# Patient Record
Sex: MALE | Race: WHITE | NOT HISPANIC OR LATINO | URBAN - NONMETROPOLITAN AREA
[De-identification: names, ages, dates, MRNs, and addresses within clinical notes are randomized per-mention and may not be internally consistent; named-entity substitution may affect disease eponyms.]

---

## 2021-01-26 ENCOUNTER — COMPLETE SKIN EXAM (OUTPATIENT)
Dept: URBAN - NONMETROPOLITAN AREA CLINIC 2 | Facility: CLINIC | Age: 66
Setting detail: DERMATOLOGY
End: 2021-01-26

## 2021-01-26 DIAGNOSIS — D22.9 MELANOCYTIC NEVI, UNSPECIFIED: ICD-10-CM

## 2021-01-26 DIAGNOSIS — L72.8 OTHER FOLLICULAR CYSTS OF THE SKIN AND SUBCUTANEOUS TISSUE: ICD-10-CM

## 2021-01-26 DIAGNOSIS — Z85.828 PERSONAL HISTORY OF OTHER MALIGNANT NEOPLASM OF SKIN: ICD-10-CM

## 2021-01-26 DIAGNOSIS — L30.9 DERMATITIS, UNSPECIFIED: ICD-10-CM

## 2021-01-26 DIAGNOSIS — L82.1 OTHER SEBORRHEIC KERATOSIS: ICD-10-CM

## 2021-01-26 DIAGNOSIS — L81.4 OTHER MELANIN HYPERPIGMENTATION: ICD-10-CM

## 2021-01-26 PROCEDURE — 17003 DESTRUCT PREMALG LES 2-14: CPT

## 2021-01-26 PROCEDURE — 99214 OFFICE O/P EST MOD 30 MIN: CPT

## 2021-01-26 PROCEDURE — 11102 TANGNTL BX SKIN SINGLE LES: CPT

## 2021-01-26 PROCEDURE — 17000 DESTRUCT PREMALG LESION: CPT

## 2022-01-27 ENCOUNTER — COMPLETE SKIN EXAM (OUTPATIENT)
Dept: URBAN - NONMETROPOLITAN AREA CLINIC 2 | Facility: CLINIC | Age: 67
Setting detail: DERMATOLOGY
End: 2022-01-27

## 2022-02-15 ENCOUNTER — COMPLETE SKIN EXAM (OUTPATIENT)
Dept: URBAN - NONMETROPOLITAN AREA CLINIC 2 | Facility: CLINIC | Age: 67
Setting detail: DERMATOLOGY
End: 2022-02-15

## 2022-02-15 DIAGNOSIS — L73.2 HIDRADENITIS SUPPURATIVA: ICD-10-CM

## 2022-02-15 PROCEDURE — 11102 TANGNTL BX SKIN SINGLE LES: CPT

## 2022-02-15 PROCEDURE — 99214 OFFICE O/P EST MOD 30 MIN: CPT

## 2022-03-11 ENCOUNTER — EXCISION (OUTPATIENT)
Dept: URBAN - NONMETROPOLITAN AREA CLINIC 2 | Facility: CLINIC | Age: 67
Setting detail: DERMATOLOGY
End: 2022-03-11

## 2022-03-11 DIAGNOSIS — L82.0 INFLAMED SEBORRHEIC KERATOSIS: ICD-10-CM

## 2022-03-11 PROCEDURE — 11401 EXC TR-EXT B9+MARG 0.6-1 CM: CPT

## 2023-02-15 ENCOUNTER — APPOINTMENT (OUTPATIENT)
Dept: URBAN - NONMETROPOLITAN AREA SURGERY 8 | Age: 68
Setting detail: DERMATOLOGY
End: 2023-02-15

## 2023-02-15 DIAGNOSIS — L82.1 OTHER SEBORRHEIC KERATOSIS: ICD-10-CM

## 2023-02-15 DIAGNOSIS — L57.0 ACTINIC KERATOSIS: ICD-10-CM

## 2023-02-15 DIAGNOSIS — Z87.2 PERSONAL HISTORY OF DISEASES OF THE SKIN AND SUBCUTANEOUS TISSUE: ICD-10-CM

## 2023-02-15 DIAGNOSIS — D485 NEOPLASM OF UNCERTAIN BEHAVIOR OF SKIN: ICD-10-CM

## 2023-02-15 DIAGNOSIS — Z71.89 OTHER SPECIFIED COUNSELING: ICD-10-CM

## 2023-02-15 DIAGNOSIS — D18.0 HEMANGIOMA: ICD-10-CM

## 2023-02-15 PROBLEM — D18.01 HEMANGIOMA OF SKIN AND SUBCUTANEOUS TISSUE: Status: ACTIVE | Noted: 2023-02-15

## 2023-02-15 PROBLEM — D48.5 NEOPLASM OF UNCERTAIN BEHAVIOR OF SKIN: Status: ACTIVE | Noted: 2023-02-15

## 2023-02-15 PROCEDURE — OTHER DEFER: OTHER

## 2023-02-15 PROCEDURE — 17000 DESTRUCT PREMALG LESION: CPT | Mod: 59

## 2023-02-15 PROCEDURE — 99213 OFFICE O/P EST LOW 20 MIN: CPT | Mod: 25

## 2023-02-15 PROCEDURE — OTHER BIOPSY BY SHAVE METHOD: OTHER

## 2023-02-15 PROCEDURE — 69100 BIOPSY OF EXTERNAL EAR: CPT | Mod: 59

## 2023-02-15 PROCEDURE — OTHER COUNSELING: OTHER

## 2023-02-15 PROCEDURE — 11103 TANGNTL BX SKIN EA SEP/ADDL: CPT | Mod: 59

## 2023-02-15 PROCEDURE — OTHER MIPS QUALITY: OTHER

## 2023-02-15 PROCEDURE — 11102 TANGNTL BX SKIN SINGLE LES: CPT | Mod: 59

## 2023-02-15 PROCEDURE — OTHER LIQUID NITROGEN: OTHER

## 2023-02-15 PROCEDURE — 17003 DESTRUCT PREMALG LES 2-14: CPT

## 2023-02-15 ASSESSMENT — LOCATION DETAILED DESCRIPTION DERM
LOCATION DETAILED: RIGHT BUTTOCK
LOCATION DETAILED: RIGHT CENTRAL MALAR CHEEK
LOCATION DETAILED: LEFT PROXIMAL DORSAL FOREARM
LOCATION DETAILED: LEFT ANTERIOR PROXIMAL THIGH
LOCATION DETAILED: LEFT VENTRAL PROXIMAL FOREARM
LOCATION DETAILED: STERNUM
LOCATION DETAILED: RIGHT SUPERIOR UPPER BACK
LOCATION DETAILED: LEFT ANTERIOR PROXIMAL UPPER ARM
LOCATION DETAILED: LEFT DISTAL PRETIBIAL REGION
LOCATION DETAILED: RIGHT MID-UPPER BACK
LOCATION DETAILED: LEFT DISTAL CALF
LOCATION DETAILED: SUPERIOR LUMBAR SPINE
LOCATION DETAILED: RIGHT DISTAL PRETIBIAL REGION
LOCATION DETAILED: LEFT SUPERIOR HELIX
LOCATION DETAILED: LEFT KNEE

## 2023-02-15 ASSESSMENT — LOCATION ZONE DERM
LOCATION ZONE: LEG
LOCATION ZONE: TRUNK
LOCATION ZONE: FACE
LOCATION ZONE: ARM
LOCATION ZONE: EAR

## 2023-02-15 ASSESSMENT — LOCATION SIMPLE DESCRIPTION DERM
LOCATION SIMPLE: RIGHT BUTTOCK
LOCATION SIMPLE: LEFT FOREARM
LOCATION SIMPLE: LEFT EAR
LOCATION SIMPLE: LOWER BACK
LOCATION SIMPLE: LEFT KNEE
LOCATION SIMPLE: CHEST
LOCATION SIMPLE: LEFT THIGH
LOCATION SIMPLE: LEFT CALF
LOCATION SIMPLE: RIGHT UPPER BACK
LOCATION SIMPLE: LEFT UPPER ARM
LOCATION SIMPLE: LEFT PRETIBIAL REGION
LOCATION SIMPLE: RIGHT CHEEK
LOCATION SIMPLE: RIGHT PRETIBIAL REGION

## 2023-02-15 NOTE — PROCEDURE: LIQUID NITROGEN
Duration Of Freeze Thaw-Cycle (Seconds): 10
Post-Care Instructions: I reviewed with the patient in detail post-care instructions. Patient is to wear sunprotection, and avoid picking at any of the treated lesions. Pt may apply Vaseline to crusted or scabbing areas.
Render Post-Care Instructions In Note?: no
Show Aperture Variable?: Yes
Consent: The patient's consent was obtained including but not limited to risks of crusting, scabbing, blistering, scarring, darker or lighter pigmentary change, recurrence, incomplete removal and infection.
Detail Level: Detailed
Number Of Freeze-Thaw Cycles: 1 freeze-thaw cycle

## 2023-02-15 NOTE — PROCEDURE: MIPS QUALITY
Quality 110: Preventive Care And Screening: Influenza Immunization: Influenza Immunization Administered during Influenza season
Quality 47: Advance Care Plan: Advance Care Planning discussed and documented in the medical record; patient did not wish or was not able to name a surrogate decision maker or provide an advance care plan.
Quality 111:Pneumonia Vaccination Status For Older Adults: Pneumococcal vaccine (PPSV23) was not administered on or after patient’s 60th birthday and before the end of the measurement period, reason not otherwise specified
Quality 226: Preventive Care And Screening: Tobacco Use: Screening And Cessation Intervention: Patient screened for tobacco use and is an ex/non-smoker
Detail Level: Detailed
Quality 130: Documentation Of Current Medications In The Medical Record: Current Medications Documented
Quality 431: Preventive Care And Screening: Unhealthy Alcohol Use - Screening: Patient not identified as an unhealthy alcohol user when screened for unhealthy alcohol use using a systematic screening method

## 2023-02-15 NOTE — PROCEDURE: BIOPSY BY SHAVE METHOD
Body Location Override (Optional - Billing Will Still Be Based On Selected Body Map Location If Applicable): left helixal rim

## 2023-02-15 NOTE — PROCEDURE: DEFER
Detail Level: Detailed
Body Location Override (Optional - Billing Will Still Be Based On Selected Body Map Location If Applicable): left posterior distal leg
Size Of Lesion In Cm (Optional): 0
Procedure To Be Performed At Next Visit: Biopsy by shave method
Introduction Text (Please End With A Colon): The following procedure was deferred:
Body Location Override (Optional - Billing Will Still Be Based On Selected Body Map Location If Applicable): mid lumbar spine
Body Location Override (Optional - Billing Will Still Be Based On Selected Body Map Location If Applicable): left anterior thigh

## 2023-03-03 ENCOUNTER — APPOINTMENT (OUTPATIENT)
Dept: URBAN - NONMETROPOLITAN AREA SURGERY 8 | Age: 68
Setting detail: DERMATOLOGY
End: 2023-03-06

## 2023-03-03 DIAGNOSIS — D485 NEOPLASM OF UNCERTAIN BEHAVIOR OF SKIN: ICD-10-CM

## 2023-03-03 PROBLEM — D48.5 NEOPLASM OF UNCERTAIN BEHAVIOR OF SKIN: Status: ACTIVE | Noted: 2023-03-03

## 2023-03-03 PROBLEM — C44.519 BASAL CELL CARCINOMA OF SKIN OF OTHER PART OF TRUNK: Status: ACTIVE | Noted: 2023-03-03

## 2023-03-03 PROCEDURE — OTHER BIOPSY BY SHAVE METHOD: OTHER

## 2023-03-03 PROCEDURE — 11103 TANGNTL BX SKIN EA SEP/ADDL: CPT

## 2023-03-03 PROCEDURE — OTHER EXCISION: OTHER

## 2023-03-03 PROCEDURE — 13101 CMPLX RPR TRUNK 2.6-7.5 CM: CPT | Mod: 59

## 2023-03-03 PROCEDURE — 11102 TANGNTL BX SKIN SINGLE LES: CPT | Mod: 59

## 2023-03-03 PROCEDURE — 11602 EXC TR-EXT MAL+MARG 1.1-2 CM: CPT

## 2023-03-03 ASSESSMENT — LOCATION SIMPLE DESCRIPTION DERM
LOCATION SIMPLE: LEFT CALF
LOCATION SIMPLE: LEFT THIGH
LOCATION SIMPLE: LOWER BACK

## 2023-03-03 ASSESSMENT — LOCATION DETAILED DESCRIPTION DERM
LOCATION DETAILED: LEFT DISTAL CALF
LOCATION DETAILED: LEFT ANTERIOR PROXIMAL THIGH
LOCATION DETAILED: SUPERIOR LUMBAR SPINE

## 2023-03-03 ASSESSMENT — LOCATION ZONE DERM
LOCATION ZONE: TRUNK
LOCATION ZONE: LEG

## 2023-03-03 NOTE — PROCEDURE: BIOPSY BY SHAVE METHOD
Patient position supine. Patient prepped and draped per unit standard.    Safety straps applied:N/A   Silver Nitrate Text: The wound bed was treated with silver nitrate after the biopsy was performed.

## 2023-03-03 NOTE — PROCEDURE: EXCISION
Hypotension Repair Performed By Another Provider Text (Leave Blank If You Do Not Want): After the tissue was excised the defect was repaired by another provider.

## 2023-03-03 NOTE — PROCEDURE: EXCISION
COUGH/SHORTNESS OF BREATH Complex Repair And Single Advancement Flap Text: The defect edges were debeveled with a #15 scalpel blade.  The primary defect was closed partially with a complex linear closure.  Given the location of the remaining defect, shape of the defect and the proximity to free margins a single advancement flap was deemed most appropriate for complete closure of the defect.  Using a sterile surgical marker, an appropriate advancement flap was drawn incorporating the defect and placing the expected incisions within the relaxed skin tension lines where possible. The area thus outlined was incised deep to adipose tissue with a #15 scalpel blade. The skin margins were undermined to an appropriate distance in all directions utilizing iris scissors and carried over to close the primary defect.

## 2023-03-21 ENCOUNTER — APPOINTMENT (OUTPATIENT)
Dept: URBAN - NONMETROPOLITAN AREA SURGERY 8 | Age: 68
Setting detail: DERMATOLOGY
End: 2023-03-21

## 2023-03-21 VITALS
DIASTOLIC BLOOD PRESSURE: 77 MMHG | HEART RATE: 72 BPM | RESPIRATION RATE: 14 BRPM | TEMPERATURE: 98.2 F | SYSTOLIC BLOOD PRESSURE: 127 MMHG

## 2023-03-21 PROBLEM — C44.719 BASAL CELL CARCINOMA OF SKIN OF LEFT LOWER LIMB, INCLUDING HIP: Status: ACTIVE | Noted: 2023-03-21

## 2023-03-21 PROCEDURE — OTHER MOHS SURGERY: OTHER

## 2023-03-21 PROCEDURE — 17313 MOHS 1 STAGE T/A/L: CPT

## 2023-03-21 PROCEDURE — 17314 MOHS ADDL STAGE T/A/L: CPT

## 2023-03-21 NOTE — PROCEDURE: MOHS SURGERY
Documentation of consultation visit will be available in patient's [facility] report.   H Plasty Text: Given the location of the defect, shape of the defect and the proximity to free margins a H-plasty was deemed most appropriate for repair.  Using a sterile surgical marker, the appropriate advancement arms of the H-plasty were drawn incorporating the defect and placing the expected incisions within the relaxed skin tension lines where possible. The area thus outlined was incised deep to adipose tissue with a #15 scalpel blade. The skin margins were undermined to an appropriate distance in all directions utilizing iris scissors.  The opposing advancement arms were then advanced into place in opposite direction and anchored with interrupted buried subcutaneous sutures.

## 2023-03-21 NOTE — PROCEDURE: MOHS SURGERY
Body Location Override (Optional - Billing Will Still Be Based On Selected Body Map Location If Applicable): Left distal calf

## 2023-03-21 NOTE — HPI: PROCEDURE (MOHS)
Has The Growth Been Previously Biopsied?: has been previously biopsied
Body Location Override (Optional): Left distal calf
Family Member: Brother

## 2023-03-21 NOTE — PROCEDURE: MOHS SURGERY
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

## 2023-03-21 NOTE — PROCEDURE: MOHS SURGERY
Post procedure patient extubated, noted to have right side weakness, non verbal, not following commands.  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  Taken back to IR immediately for possible thrombectomy.  Procedure is medical necessity.  Wife Lesley updated of situation.       Intermediate Repair And Flap Additional Text (Will Appearing After The Standard Complex Repair Text): The intermediate repair was not sufficient to completely close the primary defect. The remaining additional defect was repaired with the flap mentioned below.

## 2023-03-21 NOTE — PROCEDURE: MOHS SURGERY
Depth Of Tumor Invasion (For Histology): dermis Spironolactone Pregnancy And Lactation Text: This medication can cause feminization of the male fetus and should be avoided during pregnancy. The active metabolite is also found in breast milk.

## 2023-05-01 ENCOUNTER — APPOINTMENT (OUTPATIENT)
Dept: URBAN - NONMETROPOLITAN AREA SURGERY 8 | Age: 68
Setting detail: DERMATOLOGY
End: 2023-05-01

## 2023-05-01 ENCOUNTER — APPOINTMENT (OUTPATIENT)
Dept: URBAN - NONMETROPOLITAN AREA SURGERY 8 | Age: 68
Setting detail: DERMATOLOGY
End: 2023-05-09

## 2023-05-01 DIAGNOSIS — L98419 CHRONIC ULCER OF OTHER SPECIFIED SITES: ICD-10-CM

## 2023-05-01 DIAGNOSIS — L98429 CHRONIC ULCER OF OTHER SPECIFIED SITES: ICD-10-CM

## 2023-05-01 DIAGNOSIS — D22 MELANOCYTIC NEVI: ICD-10-CM

## 2023-05-01 PROBLEM — C44.519 BASAL CELL CARCINOMA OF SKIN OF OTHER PART OF TRUNK: Status: ACTIVE | Noted: 2023-05-01

## 2023-05-01 PROBLEM — D22.72 MELANOCYTIC NEVI OF LEFT LOWER LIMB, INCLUDING HIP: Status: ACTIVE | Noted: 2023-05-01

## 2023-05-01 PROBLEM — L97.321 NON-PRESSURE CHRONIC ULCER OF LEFT ANKLE LIMITED TO BREAKDOWN OF SKIN: Status: ACTIVE | Noted: 2023-05-01

## 2023-05-01 PROCEDURE — 13120 CMPLX RPR S/A/L 1.1-2.5 CM: CPT

## 2023-05-01 PROCEDURE — OTHER COUNSELING: OTHER

## 2023-05-01 PROCEDURE — 11602 EXC TR-EXT MAL+MARG 1.1-2 CM: CPT

## 2023-05-01 PROCEDURE — 13101 CMPLX RPR TRUNK 2.6-7.5 CM: CPT

## 2023-05-01 PROCEDURE — 11401 EXC TR-EXT B9+MARG 0.6-1 CM: CPT

## 2023-05-01 PROCEDURE — OTHER MIPS QUALITY: OTHER

## 2023-05-01 PROCEDURE — OTHER SILVER NITRATE: OTHER

## 2023-05-01 PROCEDURE — OTHER EXCISION: OTHER

## 2023-05-01 PROCEDURE — 99213 OFFICE O/P EST LOW 20 MIN: CPT

## 2023-05-01 ASSESSMENT — LOCATION DETAILED DESCRIPTION DERM
LOCATION DETAILED: LEFT ANTERIOR PROXIMAL THIGH
LOCATION DETAILED: LEFT POSTERIOR ANKLE

## 2023-05-01 ASSESSMENT — LOCATION SIMPLE DESCRIPTION DERM
LOCATION SIMPLE: LEFT THIGH
LOCATION SIMPLE: LEFT ANKLE

## 2023-05-01 ASSESSMENT — LOCATION ZONE DERM
LOCATION ZONE: LEG
LOCATION ZONE: LEG

## 2023-05-01 NOTE — PROCEDURE: SILVER NITRATE
Detail Level: Detailed
Add 85932 Code?: No
Procedure Details: A silver nitrate stick was used for chemical cautery.

## 2023-05-01 NOTE — PROCEDURE: MIPS QUALITY
Quality 431: Preventive Care And Screening: Unhealthy Alcohol Use - Screening: Patient not identified as an unhealthy alcohol user when screened for unhealthy alcohol use using a systematic screening method
Quality 226: Preventive Care And Screening: Tobacco Use: Screening And Cessation Intervention: Patient screened for tobacco use and is an ex/non-smoker
Quality 130: Documentation Of Current Medications In The Medical Record: Current Medications Documented
Quality 111:Pneumonia Vaccination Status For Older Adults: Pneumococcal vaccine (PPSV23) administered on or after patient’s 60th birthday and before the end of the measurement period
Quality 47: Advance Care Plan: Advance Care Planning discussed and documented in the medical record; patient did not wish or was not able to name a surrogate decision maker or provide an advance care plan.
Additional Notes: *** Meds documented to the best of my ability with the resources available
Quality 110: Preventive Care And Screening: Influenza Immunization: Influenza Immunization previously received during influenza season
Detail Level: Detailed

## 2023-05-10 ENCOUNTER — APPOINTMENT (OUTPATIENT)
Dept: URBAN - NONMETROPOLITAN AREA SURGERY 8 | Age: 68
Setting detail: DERMATOLOGY
End: 2023-05-17

## 2023-05-10 DIAGNOSIS — Z48.02 ENCOUNTER FOR REMOVAL OF SUTURES: ICD-10-CM

## 2023-05-10 PROCEDURE — OTHER MIPS QUALITY: OTHER

## 2023-05-10 PROCEDURE — OTHER SUTURE REMOVAL (GLOBAL PERIOD): OTHER

## 2023-05-10 ASSESSMENT — LOCATION SIMPLE DESCRIPTION DERM
LOCATION SIMPLE: LOWER BACK
LOCATION SIMPLE: LEFT THIGH

## 2023-05-10 ASSESSMENT — LOCATION ZONE DERM
LOCATION ZONE: TRUNK
LOCATION ZONE: LEG

## 2023-05-10 ASSESSMENT — LOCATION DETAILED DESCRIPTION DERM
LOCATION DETAILED: SUPERIOR LUMBAR SPINE
LOCATION DETAILED: LEFT ANTERIOR PROXIMAL THIGH

## 2023-05-10 NOTE — PROCEDURE: SUTURE REMOVAL (GLOBAL PERIOD)
Body Location Override (Optional - Billing Will Still Be Based On Selected Body Map Location If Applicable): lumbar spine
Detail Level: Detailed
Add 93135 Cpt? (Important Note: In 2017 The Use Of 84356 Is Being Tracked By Cms To Determine Future Global Period Reimbursement For Global Periods): no
Body Location Override (Optional - Billing Will Still Be Based On Selected Body Map Location If Applicable): left anterior thigh

## 2023-05-10 NOTE — PROCEDURE: MIPS QUALITY
Quality 431: Preventive Care And Screening: Unhealthy Alcohol Use - Screening: Patient not identified as an unhealthy alcohol user when screened for unhealthy alcohol use using a systematic screening method
Additional Notes: *** Meds documented to the best of my ability with the resources available
Quality 111:Pneumonia Vaccination Status For Older Adults: Pneumococcal vaccine (PPSV23) administered on or after patient’s 60th birthday and before the end of the measurement period
Quality 110: Preventive Care And Screening: Influenza Immunization: Influenza Immunization previously received during influenza season
Quality 130: Documentation Of Current Medications In The Medical Record: Current Medications Documented
Detail Level: Detailed
Quality 47: Advance Care Plan: Advance Care Planning discussed and documented in the medical record; patient did not wish or was not able to name a surrogate decision maker or provide an advance care plan.
Quality 226: Preventive Care And Screening: Tobacco Use: Screening And Cessation Intervention: Patient screened for tobacco use and is an ex/non-smoker

## 2023-09-25 ENCOUNTER — APPOINTMENT (OUTPATIENT)
Dept: URBAN - NONMETROPOLITAN AREA SURGERY 8 | Age: 68
Setting detail: DERMATOLOGY
End: 2023-09-25

## 2023-09-25 DIAGNOSIS — Z71.89 OTHER SPECIFIED COUNSELING: ICD-10-CM

## 2023-09-25 DIAGNOSIS — Z87.2 PERSONAL HISTORY OF DISEASES OF THE SKIN AND SUBCUTANEOUS TISSUE: ICD-10-CM

## 2023-09-25 DIAGNOSIS — L57.0 ACTINIC KERATOSIS: ICD-10-CM

## 2023-09-25 DIAGNOSIS — L57.8 OTHER SKIN CHANGES DUE TO CHRONIC EXPOSURE TO NONIONIZING RADIATION: ICD-10-CM

## 2023-09-25 DIAGNOSIS — D485 NEOPLASM OF UNCERTAIN BEHAVIOR OF SKIN: ICD-10-CM

## 2023-09-25 DIAGNOSIS — L82.1 OTHER SEBORRHEIC KERATOSIS: ICD-10-CM

## 2023-09-25 DIAGNOSIS — D22 MELANOCYTIC NEVI: ICD-10-CM

## 2023-09-25 DIAGNOSIS — D18.0 HEMANGIOMA: ICD-10-CM

## 2023-09-25 DIAGNOSIS — Z85.828 PERSONAL HISTORY OF OTHER MALIGNANT NEOPLASM OF SKIN: ICD-10-CM

## 2023-09-25 PROBLEM — D22.5 MELANOCYTIC NEVI OF TRUNK: Status: ACTIVE | Noted: 2023-09-25

## 2023-09-25 PROBLEM — D18.01 HEMANGIOMA OF SKIN AND SUBCUTANEOUS TISSUE: Status: ACTIVE | Noted: 2023-09-25

## 2023-09-25 PROBLEM — D48.5 NEOPLASM OF UNCERTAIN BEHAVIOR OF SKIN: Status: ACTIVE | Noted: 2023-09-25

## 2023-09-25 PROCEDURE — OTHER BIOPSY BY SHAVE METHOD: OTHER

## 2023-09-25 PROCEDURE — OTHER COUNSELING: OTHER

## 2023-09-25 PROCEDURE — 99213 OFFICE O/P EST LOW 20 MIN: CPT | Mod: 25

## 2023-09-25 PROCEDURE — OTHER MIPS QUALITY: OTHER

## 2023-09-25 PROCEDURE — 11102 TANGNTL BX SKIN SINGLE LES: CPT

## 2023-09-25 PROCEDURE — 17000 DESTRUCT PREMALG LESION: CPT | Mod: 59

## 2023-09-25 PROCEDURE — 11103 TANGNTL BX SKIN EA SEP/ADDL: CPT

## 2023-09-25 PROCEDURE — OTHER LIQUID NITROGEN: OTHER

## 2023-09-25 ASSESSMENT — LOCATION ZONE DERM
LOCATION ZONE: SCALP
LOCATION ZONE: TRUNK
LOCATION ZONE: NECK
LOCATION ZONE: LIP
LOCATION ZONE: FACE
LOCATION ZONE: LEG

## 2023-09-25 ASSESSMENT — LOCATION DETAILED DESCRIPTION DERM
LOCATION DETAILED: LEFT KNEE
LOCATION DETAILED: LEFT LATERAL SUPERIOR CHEST
LOCATION DETAILED: RIGHT MEDIAL INFERIOR CHEST
LOCATION DETAILED: RIGHT CLAVICULAR SKIN
LOCATION DETAILED: SUPERIOR THORACIC SPINE
LOCATION DETAILED: INFERIOR THORACIC SPINE
LOCATION DETAILED: LEFT ANTERIOR PROXIMAL THIGH
LOCATION DETAILED: RIGHT POSTERIOR NECK
LOCATION DETAILED: LEFT SUPERIOR UPPER BACK
LOCATION DETAILED: STERNUM
LOCATION DETAILED: LEFT UPPER CUTANEOUS LIP
LOCATION DETAILED: RIGHT INFERIOR MEDIAL FOREHEAD
LOCATION DETAILED: RIGHT INFERIOR FRONTAL SCALP
LOCATION DETAILED: RIGHT BUTTOCK

## 2023-09-25 ASSESSMENT — LOCATION SIMPLE DESCRIPTION DERM
LOCATION SIMPLE: UPPER BACK
LOCATION SIMPLE: LEFT UPPER BACK
LOCATION SIMPLE: RIGHT CLAVICULAR SKIN
LOCATION SIMPLE: RIGHT FOREHEAD
LOCATION SIMPLE: LEFT THIGH
LOCATION SIMPLE: RIGHT BUTTOCK
LOCATION SIMPLE: CHEST
LOCATION SIMPLE: LEFT LIP
LOCATION SIMPLE: POSTERIOR NECK
LOCATION SIMPLE: LEFT KNEE
LOCATION SIMPLE: SCALP

## 2023-09-25 NOTE — PROCEDURE: LIQUID NITROGEN
Consent: The patient's consent was obtained including but not limited to risks of crusting, scabbing, blistering, scarring, darker or lighter pigmentary change, recurrence, incomplete removal and infection.
Show Aperture Variable?: Yes
Post-Care Instructions: I reviewed with the patient in detail post-care instructions. Patient is to wear sunprotection, and avoid picking at any of the treated lesions. Pt may apply Vaseline to crusted or scabbing areas.
Duration Of Freeze Thaw-Cycle (Seconds): 10
Render Note In Bullet Format When Appropriate: No
Number Of Freeze-Thaw Cycles: 1 freeze-thaw cycle
Detail Level: Detailed

## 2023-09-25 NOTE — HPI: FULL BODY SKIN EXAMINATION
What Is The Reason For Today's Visit?: Full Body Skin Examination
What Is The Reason For Today's Visit? (Being Monitored For X): concerning skin lesions on a periodic basis
Additional History: Specifically check a raised lesion on the right post-auricular. The lesion has been present x 2 weeks.

## 2023-09-25 NOTE — PROCEDURE: MIPS QUALITY
Quality 47: Advance Care Plan: Advance Care Planning discussed and documented in the medical record; patient did not wish or was not able to name a surrogate decision maker or provide an advance care plan.
Quality 226: Preventive Care And Screening: Tobacco Use: Screening And Cessation Intervention: Patient screened for tobacco use and is an ex/non-smoker
Quality 431: Preventive Care And Screening: Unhealthy Alcohol Use - Screening: Patient not identified as an unhealthy alcohol user when screened for unhealthy alcohol use using a systematic screening method
Detail Level: Detailed
Quality 130: Documentation Of Current Medications In The Medical Record: Current Medications Documented
Quality 111:Pneumonia Vaccination Status For Older Adults: Pneumococcal vaccine (PPSV23) administered on or after patient’s 60th birthday and before the end of the measurement period
Additional Notes: *** Meds documented to the best of my ability with the resources available
Quality 110: Preventive Care And Screening: Influenza Immunization: Influenza Immunization previously received during influenza season

## 2023-09-25 NOTE — PROCEDURE: BIOPSY BY SHAVE METHOD
Body Location Override (Optional - Billing Will Still Be Based On Selected Body Map Location If Applicable): left upper scapula

## 2023-09-25 NOTE — PROCEDURE: COUNSELING
Detail Level: Simple
Sunscreen Recommendations: Use a sun block with zinc or titanium as primary ingredient EVERY day to face and other sun sensitive areas. A physical sun block, containing zinc and titanium is best as it reflects sun off your skin.  Pure zinc and/ or titanium sun blocks are also excellent for patients with allergies or sensitivities to sunscreen.  Sun blocks should not be used to increase the time spent in sunlight.  \\nUse a sunscreen with zinc or titanium oxide EVERY day.  We recommend Elta MD and ISDIN products.  ISDIN Ageless or Actinica are zinc based but also have an antioxidant and DNA repair enzyme in their formulation which protects your skin on several levels to minimize DNA damage to your skin cells.\\nWear protective clothing, including a hat with a wide brim and long-sleeved shirt and pants during prolonged periods of sun exposure.   A tight weave fabric is best.  Special protective clothing is available through several companies: Coolibar  - www.coolibar.com or Solumbra - www.solumbra.com. Sunsleeve is a great accessory to combat the sun while wearing short sleeve shirts.
Nicotinamide Supplementation Recommendations: SunISDIN or Heliocare (Polypodium leucotomos) capsules are recommended to help the skin protect itself from the sun.  \\nSunISDIN: Take 1 capsule each morning with water or juice. \\nHeliocare Advanced (with nicotinamide):  Take 2 capsules daily.  Nicotinamide lowers risk of SCC by 24%.
Detail Level: Generalized
Detail Level: Zone
Detail Level: Detailed

## 2023-10-09 ENCOUNTER — APPOINTMENT (OUTPATIENT)
Dept: URBAN - NONMETROPOLITAN AREA SURGERY 8 | Age: 68
Setting detail: DERMATOLOGY
End: 2023-10-16

## 2023-10-09 DIAGNOSIS — L08.9 LOCAL INFECTION OF THE SKIN AND SUBCUTANEOUS TISSUE, UNSPECIFIED: ICD-10-CM

## 2023-10-09 PROBLEM — C44.41 BASAL CELL CARCINOMA OF SKIN OF SCALP AND NECK: Status: ACTIVE | Noted: 2023-10-09

## 2023-10-09 PROBLEM — C44.519 BASAL CELL CARCINOMA OF SKIN OF OTHER PART OF TRUNK: Status: ACTIVE | Noted: 2023-10-09

## 2023-10-09 PROCEDURE — 17270 DSTR MAL LES S/N/H/F/G .5 /<: CPT

## 2023-10-09 PROCEDURE — OTHER ADDITIONAL NOTES: OTHER

## 2023-10-09 PROCEDURE — OTHER PRESCRIPTION: OTHER

## 2023-10-09 PROCEDURE — 99212 OFFICE O/P EST SF 10 MIN: CPT | Mod: 25

## 2023-10-09 PROCEDURE — OTHER MIPS QUALITY: OTHER

## 2023-10-09 PROCEDURE — OTHER CURETTAGE AND DESTRUCTION: OTHER

## 2023-10-09 PROCEDURE — OTHER CURETTAGE: OTHER

## 2023-10-09 PROCEDURE — OTHER COUNSELING: OTHER

## 2023-10-09 PROCEDURE — 17260 DSTRJ MAL LES T/A/L 0.5 CM/<: CPT

## 2023-10-09 RX ORDER — DOXYCYCLINE HYCLATE 100 MG/1
CAPSULE, GELATIN COATED ORAL
Qty: 14 | Refills: 0 | Status: ERX | COMMUNITY
Start: 2023-10-09

## 2023-10-09 RX ORDER — MUPIROCIN 20 MG/G
OINTMENT TOPICAL
Qty: 15 | Refills: 1 | Status: ERX | COMMUNITY
Start: 2023-10-09

## 2023-10-09 ASSESSMENT — LOCATION SIMPLE DESCRIPTION DERM: LOCATION SIMPLE: SCALP

## 2023-10-09 ASSESSMENT — LOCATION ZONE DERM: LOCATION ZONE: SCALP

## 2023-10-09 ASSESSMENT — LOCATION DETAILED DESCRIPTION DERM: LOCATION DETAILED: RIGHT INFERIOR FRONTAL SCALP

## 2023-10-09 NOTE — PROCEDURE: CURETTAGE
Body Location Override (Optional - Billing Will Still Be Based On Selected Body Map Location If Applicable): left upper scapula
Detail Level: Detailed
Size Of Lesion In Cm: 0.7
Size Of Lesion After Curettage: 0.1
Anesthesia Type: 1% lidocaine with epinephrine
Anesthesia Volume In Cc: 3
Cautery Type: electrodesiccation
Additional Information: (Optional): The wound was cleaned, and a pressure dressing was applied.  The patient received detailed post-op instructions.
Consent was obtained from the patient. The risks, benefits and alternatives to therapy were discussed in detail. Specifically, the risks of infection, scarring, bleeding, prolonged wound healing, nerve injury, incomplete removal, allergy to anesthesia and recurrence were addressed. Alternatives to ED&C, such as: surgical removal and XRT were also discussed.  Prior to the procedure, the treatment site was clearly identified and confirmed by the patient. All components of Universal Protocol/PAUSE Rule completed.
Render Post-Care Instructions In Note?: no
Post-Care Instructions: I reviewed with the patient in detail post-care instructions. Patient is to keep the area dry for 48 hours, and not to engage in any swimming until the area is healed. Should the patient develop any fevers, chills, bleeding, severe pain patient will contact the office immediately.
Bill As A Line Item Or As Units: Line Item

## 2023-10-09 NOTE — PROCEDURE: ADDITIONAL NOTES
Detail Level: Simple
Render Risk Assessment In Note?: no
Additional Notes: We are planning to do a punch biopsy on his RIGHT PARIETAL SCALP in November

## 2023-10-09 NOTE — PROCEDURE: MIPS QUALITY
Detail Level: Detailed
Quality 111:Pneumonia Vaccination Status For Older Adults: Pneumococcal vaccine (PPSV23) administered on or after patient’s 60th birthday and before the end of the measurement period
Additional Notes: *** Meds documented to the best of my ability with the resources available
Quality 130: Documentation Of Current Medications In The Medical Record: Current Medications Documented
Quality 431: Preventive Care And Screening: Unhealthy Alcohol Use - Screening: Patient not identified as an unhealthy alcohol user when screened for unhealthy alcohol use using a systematic screening method
Quality 110: Preventive Care And Screening: Influenza Immunization: Influenza Immunization previously received during influenza season
Quality 226: Preventive Care And Screening: Tobacco Use: Screening And Cessation Intervention: Patient screened for tobacco use and is an ex/non-smoker

## 2023-11-09 ENCOUNTER — APPOINTMENT (OUTPATIENT)
Dept: URBAN - NONMETROPOLITAN AREA SURGERY 8 | Age: 68
Setting detail: DERMATOLOGY
End: 2023-11-10

## 2023-11-09 DIAGNOSIS — D485 NEOPLASM OF UNCERTAIN BEHAVIOR OF SKIN: ICD-10-CM

## 2023-11-09 PROBLEM — D48.5 NEOPLASM OF UNCERTAIN BEHAVIOR OF SKIN: Status: ACTIVE | Noted: 2023-11-09

## 2023-11-09 PROCEDURE — 99212 OFFICE O/P EST SF 10 MIN: CPT

## 2023-11-09 PROCEDURE — OTHER ADDITIONAL NOTES: OTHER

## 2023-11-09 PROCEDURE — OTHER ACQUIRED DEFORMITY COUNSELING: OTHER

## 2023-11-09 PROCEDURE — OTHER OBSERVATION: OTHER

## 2023-11-09 ASSESSMENT — LOCATION DETAILED DESCRIPTION DERM: LOCATION DETAILED: RIGHT INFERIOR FRONTAL SCALP

## 2023-11-09 ASSESSMENT — LOCATION SIMPLE DESCRIPTION DERM: LOCATION SIMPLE: SCALP

## 2023-11-09 ASSESSMENT — LOCATION ZONE DERM: LOCATION ZONE: SCALP

## 2023-11-09 NOTE — PROCEDURE: ADDITIONAL NOTES
Render Risk Assessment In Note?: no
Additional Notes: Patient will be back in 3 months for his FSE and we will recheck. His spot on the right scalp\\nPatient is having a total knee replacement on December,2023.
Detail Level: Simple
Additional Notes: We discussed a rebiopsy today based on pathology. He feels it is better and would like to monitor for 1-2 months. today it looks benign - such as ruptured folliculitis (mentioned in path report). we will monitor for next 1-2 months. If nodularity develops or any changes occur, he should let me know

## 2023-11-09 NOTE — PROCEDURE: OBSERVATION
Body Location Override (Optional - Billing Will Still Be Based On Selected Body Map Location If Applicable): RIGHT PARIETAL SCALP
Detail Level: Simple
Size Of Lesion In Cm (Optional): 0

## 2024-03-26 NOTE — HPI: PROCEDURE (SKIN SURGERY)
Has The Growth Been Previously Biopsied?: has been previously biopsied
Body Location Override (Optional): Superior lumbar spine and left anterior proximal thigh
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

## 2024-05-21 ENCOUNTER — APPOINTMENT (OUTPATIENT)
Dept: URBAN - NONMETROPOLITAN AREA SURGERY 8 | Age: 69
Setting detail: DERMATOLOGY
End: 2024-05-28

## 2024-05-21 DIAGNOSIS — Z87.2 PERSONAL HISTORY OF DISEASES OF THE SKIN AND SUBCUTANEOUS TISSUE: ICD-10-CM

## 2024-05-21 DIAGNOSIS — D22 MELANOCYTIC NEVI: ICD-10-CM

## 2024-05-21 DIAGNOSIS — L57.0 ACTINIC KERATOSIS: ICD-10-CM

## 2024-05-21 DIAGNOSIS — Z85.828 PERSONAL HISTORY OF OTHER MALIGNANT NEOPLASM OF SKIN: ICD-10-CM

## 2024-05-21 DIAGNOSIS — L57.8 OTHER SKIN CHANGES DUE TO CHRONIC EXPOSURE TO NONIONIZING RADIATION: ICD-10-CM

## 2024-05-21 DIAGNOSIS — L82.1 OTHER SEBORRHEIC KERATOSIS: ICD-10-CM

## 2024-05-21 DIAGNOSIS — D485 NEOPLASM OF UNCERTAIN BEHAVIOR OF SKIN: ICD-10-CM

## 2024-05-21 DIAGNOSIS — D18.0 HEMANGIOMA: ICD-10-CM

## 2024-05-21 DIAGNOSIS — Z71.89 OTHER SPECIFIED COUNSELING: ICD-10-CM

## 2024-05-21 PROBLEM — D48.5 NEOPLASM OF UNCERTAIN BEHAVIOR OF SKIN: Status: ACTIVE | Noted: 2024-05-21

## 2024-05-21 PROBLEM — D22.9 MELANOCYTIC NEVI, UNSPECIFIED: Status: ACTIVE | Noted: 2024-05-21

## 2024-05-21 PROBLEM — D18.01 HEMANGIOMA OF SKIN AND SUBCUTANEOUS TISSUE: Status: ACTIVE | Noted: 2024-05-21

## 2024-05-21 PROCEDURE — OTHER COUNSELING: OTHER

## 2024-05-21 PROCEDURE — 17003 DESTRUCT PREMALG LES 2-14: CPT

## 2024-05-21 PROCEDURE — 17000 DESTRUCT PREMALG LESION: CPT | Mod: 59

## 2024-05-21 PROCEDURE — 99213 OFFICE O/P EST LOW 20 MIN: CPT | Mod: 25

## 2024-05-21 PROCEDURE — 11103 TANGNTL BX SKIN EA SEP/ADDL: CPT

## 2024-05-21 PROCEDURE — OTHER BIOPSY BY SHAVE METHOD: OTHER

## 2024-05-21 PROCEDURE — OTHER LIQUID NITROGEN: OTHER

## 2024-05-21 PROCEDURE — 11102 TANGNTL BX SKIN SINGLE LES: CPT

## 2024-05-21 PROCEDURE — OTHER MIPS QUALITY: OTHER

## 2024-05-21 ASSESSMENT — LOCATION SIMPLE DESCRIPTION DERM
LOCATION SIMPLE: UPPER BACK
LOCATION SIMPLE: LEFT FOREARM
LOCATION SIMPLE: LEFT PRETIBIAL REGION
LOCATION SIMPLE: RIGHT HAND
LOCATION SIMPLE: NECK
LOCATION SIMPLE: LOWER BACK
LOCATION SIMPLE: LEFT THIGH
LOCATION SIMPLE: CHEST
LOCATION SIMPLE: RIGHT UPPER BACK
LOCATION SIMPLE: LEFT UPPER BACK
LOCATION SIMPLE: LEFT CALF
LOCATION SIMPLE: RIGHT FOREARM

## 2024-05-21 ASSESSMENT — LOCATION DETAILED DESCRIPTION DERM
LOCATION DETAILED: LEFT DISTAL CALF
LOCATION DETAILED: STERNUM
LOCATION DETAILED: LEFT PROXIMAL PRETIBIAL REGION
LOCATION DETAILED: LEFT ANTERIOR PROXIMAL THIGH
LOCATION DETAILED: RIGHT SUPERIOR LATERAL NECK
LOCATION DETAILED: RIGHT CENTRAL POSTERIOR NECK
LOCATION DETAILED: LEFT PROXIMAL DORSAL FOREARM
LOCATION DETAILED: RIGHT SUPERIOR UPPER BACK
LOCATION DETAILED: INFERIOR LUMBAR SPINE
LOCATION DETAILED: SUPERIOR THORACIC SPINE
LOCATION DETAILED: RIGHT VENTRAL PROXIMAL FOREARM
LOCATION DETAILED: LEFT SUPERIOR UPPER BACK
LOCATION DETAILED: LEFT PROXIMAL RADIAL DORSAL FOREARM
LOCATION DETAILED: RIGHT PROXIMAL DORSAL FOREARM
LOCATION DETAILED: RIGHT RADIAL DORSAL HAND

## 2024-05-21 ASSESSMENT — LOCATION ZONE DERM
LOCATION ZONE: HAND
LOCATION ZONE: LEG
LOCATION ZONE: TRUNK
LOCATION ZONE: NECK
LOCATION ZONE: ARM

## 2024-05-21 NOTE — PROCEDURE: BIOPSY BY SHAVE METHOD
Body Location Override (Optional - Billing Will Still Be Based On Selected Body Map Location If Applicable): mid upper spine
Detail Level: Detailed
Depth Of Biopsy: dermis
Was A Bandage Applied: Yes
Size Of Lesion In Cm: 0
Biopsy Type: H and E
Biopsy Method: Dermablade
Anesthesia Type: 1% lidocaine with epinephrine
Anesthesia Volume In Cc: 0.5
Hemostasis: Drysol
Wound Care: Petrolatum
Dressing: bandage
Destruction After The Procedure: No
Type Of Destruction Used: Curettage
Curettage Text: The wound bed was treated with curettage after the biopsy was performed.
Cryotherapy Text: The wound bed was treated with cryotherapy after the biopsy was performed.
Electrodesiccation Text: The wound bed was treated with electrodesiccation after the biopsy was performed.
Electrodesiccation And Curettage Text: The wound bed was treated with electrodesiccation and curettage after the biopsy was performed.
Silver Nitrate Text: The wound bed was treated with silver nitrate after the biopsy was performed.
Lab: [id number]
Consent: Written consent was obtained and risks were reviewed including but not limited to scarring, infection, bleeding, scabbing, incomplete removal, nerve damage and allergy to anesthesia.
Post-Care Instructions: I reviewed with the patient in detail post-care instructions. Patient is to keep the biopsy site dry overnight, and then apply bacitracin twice daily until healed. Patient may apply hydrogen peroxide soaks to remove any crusting.
Notification Instructions: Patient will be notified of biopsy results. However, patient instructed to call the office if not contacted within 2 weeks.
Billing Type: Third-Party Bill
Information: Selecting Yes will display possible errors in your note based on the variables you have selected. This validation is only offered as a suggestion for you. PLEASE NOTE THAT THE VALIDATION TEXT WILL BE REMOVED WHEN YOU FINALIZE YOUR NOTE. IF YOU WANT TO FAX A PRELIMINARY NOTE YOU WILL NEED TO TOGGLE THIS TO 'NO' IF YOU DO NOT WANT IT IN YOUR FAXED NOTE.
Body Location Override (Optional - Billing Will Still Be Based On Selected Body Map Location If Applicable): left medial scapula
Body Location Override (Optional - Billing Will Still Be Based On Selected Body Map Location If Applicable): left anterior proximal leg
Body Location Override (Optional - Billing Will Still Be Based On Selected Body Map Location If Applicable): left mid anterior leg

## 2024-05-21 NOTE — PROCEDURE: COUNSELING
Detail Level: Generalized
Detail Level: Zone
Sunscreen Recommendations: Use a sun block with zinc or titanium as primary ingredient EVERY day to face and other sun sensitive areas. A physical sun block, containing zinc and titanium is best as it reflects sun off your skin.  Pure zinc and/ or titanium sun blocks are also excellent for patients with allergies or sensitivities to sunscreen.  Sun blocks should not be used to increase the time spent in sunlight.  \\nUse a sunscreen with zinc or titanium oxide EVERY day.  We recommend Elta MD and ISDIN products.  ISDIN Ageless or Actinica are zinc based but also have an antioxidant and DNA repair enzyme in their formulation which protects your skin on several levels to minimize DNA damage to your skin cells.\\nWear protective clothing, including a hat with a wide brim and long-sleeved shirt and pants during prolonged periods of sun exposure.   A tight weave fabric is best.  Special protective clothing is available through several companies: Coolibar  - www.coolibar.com or Solumbra - www.solumbra.com. Sunsleeve is a great accessory to combat the sun while wearing short sleeve shirts.
Detail Level: Detailed
Nicotinamide Supplementation Recommendations: SunISDIN or Heliocare (Polypodium leucotomos) capsules are recommended to help the skin protect itself from the sun.  \\nSunISDIN: Take 1 capsule each morning with water or juice. \\nHeliocare Advanced (with nicotinamide):  Take 2 capsules daily.  Nicotinamide lowers risk of SCC by 24%.
Body Location Override (Optional - Billing Will Still Be Based On Selected Body Map Location If Applicable): mid upper spine
Depth Of Biopsy: dermis
Was A Bandage Applied: Yes
Size Of Lesion In Cm: 0
Biopsy Type: H and E
Biopsy Method: Dermablade
Anesthesia Type: 1% lidocaine with epinephrine
Anesthesia Volume In Cc: 0.5
Hemostasis: Drysol
Wound Care: Petrolatum
Dressing: bandage
Destruction After The Procedure: No
Type Of Destruction Used: Curettage
Curettage Text: The wound bed was treated with curettage after the biopsy was performed.
Cryotherapy Text: The wound bed was treated with cryotherapy after the biopsy was performed.
Electrodesiccation Text: The wound bed was treated with electrodesiccation after the biopsy was performed.
Electrodesiccation And Curettage Text: The wound bed was treated with electrodesiccation and curettage after the biopsy was performed.
Silver Nitrate Text: The wound bed was treated with silver nitrate after the biopsy was performed.
Lab: [id number]
Consent: Written consent was obtained and risks were reviewed including but not limited to scarring, infection, bleeding, scabbing, incomplete removal, nerve damage and allergy to anesthesia.
Post-Care Instructions: I reviewed with the patient in detail post-care instructions. Patient is to keep the biopsy site dry overnight, and then apply bacitracin twice daily until healed. Patient may apply hydrogen peroxide soaks to remove any crusting.
Notification Instructions: Patient will be notified of biopsy results. However, patient instructed to call the office if not contacted within 2 weeks.
Billing Type: Third-Party Bill
Information: Selecting Yes will display possible errors in your note based on the variables you have selected. This validation is only offered as a suggestion for you. PLEASE NOTE THAT THE VALIDATION TEXT WILL BE REMOVED WHEN YOU FINALIZE YOUR NOTE. IF YOU WANT TO FAX A PRELIMINARY NOTE YOU WILL NEED TO TOGGLE THIS TO 'NO' IF YOU DO NOT WANT IT IN YOUR FAXED NOTE.

## 2024-06-18 ENCOUNTER — APPOINTMENT (OUTPATIENT)
Dept: URBAN - NONMETROPOLITAN AREA SURGERY 8 | Age: 69
Setting detail: DERMATOLOGY
End: 2024-06-18

## 2024-06-18 PROBLEM — C44.719 BASAL CELL CARCINOMA OF SKIN OF LEFT LOWER LIMB, INCLUDING HIP: Status: ACTIVE | Noted: 2024-06-18

## 2024-06-18 PROBLEM — C44.519 BASAL CELL CARCINOMA OF SKIN OF OTHER PART OF TRUNK: Status: ACTIVE | Noted: 2024-06-18

## 2024-06-18 PROBLEM — D04.72 CARCINOMA IN SITU OF SKIN OF LEFT LOWER LIMB, INCLUDING HIP: Status: ACTIVE | Noted: 2024-06-18

## 2024-06-18 PROCEDURE — 17262 DSTRJ MAL LES T/A/L 1.1-2.0: CPT | Mod: 76

## 2024-06-18 PROCEDURE — OTHER MIPS QUALITY: OTHER

## 2024-06-18 PROCEDURE — OTHER CURETTAGE: OTHER

## 2024-06-18 PROCEDURE — OTHER CURETTAGE AND DESTRUCTION: OTHER

## 2024-06-18 PROCEDURE — 17262 DSTRJ MAL LES T/A/L 1.1-2.0: CPT

## 2024-06-18 PROCEDURE — OTHER COUNSELING: OTHER

## 2024-06-18 PROCEDURE — 17263 DSTRJ MAL LES T/A/L 2.1-3.0: CPT

## 2024-06-18 NOTE — PROCEDURE: CURETTAGE AND DESTRUCTION
Body Location Override (Optional - Billing Will Still Be Based On Selected Body Map Location If Applicable): left mid anterior leg
Detail Level: Detailed
Accession # (Optional): [id number]
Number Of Curettages: 4
Size Of Lesion In Cm: 0.8
Size Of Lesion After Curettage: 1.7
Add Intralesional Injection: No
Concentration (Mg/Ml Or Millions Of Plaque Forming Units/Cc): 0.01
Total Volume (Ccs): 1
Anesthesia Type: 1% lidocaine with epinephrine
Anesthesia Volume In Cc: 3
What Was Performed First?: Curettage
Final Size Statement: The size of the lesion after treatment was
Additional Information: (Optional): The wound was cleaned, and a pressure dressing was applied.  The patient received detailed post-op instructions.
Consent was obtained from the patient. The risks, benefits and alternatives to therapy were discussed in detail. Specifically, the risks of infection, scarring, bleeding, prolonged wound healing, nerve injury, incomplete removal, allergy to anesthesia and recurrence were addressed. Alternatives to ED&C, such as: surgical removal and XRT were also discussed.  Prior to the procedure, the treatment site was clearly identified and confirmed by the patient. All components of Universal Protocol/PAUSE Rule completed.
Post-Care Instructions: I reviewed with the patient in detail post-care instructions. Patient is to keep the area dry for 48 hours, and not to engage in any swimming until the area is healed. Should the patient develop any fevers, chills, bleeding, severe pain patient will contact the office immediately.
Bill As A Line Item Or As Units: Line Item

## 2024-06-18 NOTE — PROCEDURE: CURETTAGE
Body Location Override (Optional - Billing Will Still Be Based On Selected Body Map Location If Applicable): left anterior proximal leg
Detail Level: Detailed
Size Of Lesion In Cm: 0.8
Size Of Lesion After Curettage: 1.7
Anesthesia Type: 1% lidocaine with epinephrine
Anesthesia Volume In Cc: 3
Cautery Type: electrodesiccation
Additional Information: (Optional): The wound was cleaned, and a pressure dressing was applied.  The patient received detailed post-op instructions.
Consent was obtained from the patient. The risks, benefits and alternatives to therapy were discussed in detail. Specifically, the risks of infection, scarring, bleeding, prolonged wound healing, nerve injury, incomplete removal, allergy to anesthesia and recurrence were addressed. Alternatives to ED&C, such as: surgical removal and XRT were also discussed.  Prior to the procedure, the treatment site was clearly identified and confirmed by the patient. All components of Universal Protocol/PAUSE Rule completed.
Render Post-Care Instructions In Note?: no
Post-Care Instructions: I reviewed with the patient in detail post-care instructions. Patient is to keep the area dry for 48 hours, and not to engage in any swimming until the area is healed. Should the patient develop any fevers, chills, bleeding, severe pain patient will contact the office immediately.
Bill As A Line Item Or As Units: Line Item
Body Location Override (Optional - Billing Will Still Be Based On Selected Body Map Location If Applicable): mid upper spine
Size Of Lesion In Cm: 1.3
Size Of Lesion After Curettage: 2.3

## 2024-06-18 NOTE — HPI: OTHER
Condition:: Surgical EDC
Please Describe Your Condition:: is an established patient who is being seen for a chief complaint of Surgical EDC for 3 biopsy proven skin cancers, specifically:\\n\\n1)Mid upper spine: Basal cell carcinoma, nodular type\\n2)Left proximal leg: Basal cell carcinoma, nodular type\\n3)Left mid anterior leg: Squamous cell in situ and scar

## 2024-12-04 NOTE — PROCEDURE: BIOPSY BY SHAVE METHOD
PT called and stated The pharmacy on file does not have the medication in stock and it will have to be sent to a different pharmacy.  Pharmacy is updated    Body Location Override (Optional - Billing Will Still Be Based On Selected Body Map Location If Applicable): right parietal scalp

## 2025-01-30 ENCOUNTER — APPOINTMENT (OUTPATIENT)
Dept: URBAN - NONMETROPOLITAN AREA SURGERY 8 | Age: 70
Setting detail: DERMATOLOGY
End: 2025-02-04

## 2025-01-30 DIAGNOSIS — L57.8 OTHER SKIN CHANGES DUE TO CHRONIC EXPOSURE TO NONIONIZING RADIATION: ICD-10-CM

## 2025-01-30 DIAGNOSIS — L57.0 ACTINIC KERATOSIS: ICD-10-CM

## 2025-01-30 DIAGNOSIS — D18.0 HEMANGIOMA: ICD-10-CM

## 2025-01-30 DIAGNOSIS — Z71.89 OTHER SPECIFIED COUNSELING: ICD-10-CM

## 2025-01-30 DIAGNOSIS — D22 MELANOCYTIC NEVI: ICD-10-CM

## 2025-01-30 DIAGNOSIS — Z87.2 PERSONAL HISTORY OF DISEASES OF THE SKIN AND SUBCUTANEOUS TISSUE: ICD-10-CM

## 2025-01-30 DIAGNOSIS — D485 NEOPLASM OF UNCERTAIN BEHAVIOR OF SKIN: ICD-10-CM

## 2025-01-30 DIAGNOSIS — Z85.828 PERSONAL HISTORY OF OTHER MALIGNANT NEOPLASM OF SKIN: ICD-10-CM

## 2025-01-30 DIAGNOSIS — L82.1 OTHER SEBORRHEIC KERATOSIS: ICD-10-CM

## 2025-01-30 PROBLEM — D48.5 NEOPLASM OF UNCERTAIN BEHAVIOR OF SKIN: Status: ACTIVE | Noted: 2025-01-30

## 2025-01-30 PROBLEM — D22.9 MELANOCYTIC NEVI, UNSPECIFIED: Status: ACTIVE | Noted: 2025-01-30

## 2025-01-30 PROBLEM — D18.01 HEMANGIOMA OF SKIN AND SUBCUTANEOUS TISSUE: Status: ACTIVE | Noted: 2025-01-30

## 2025-01-30 PROCEDURE — 11102 TANGNTL BX SKIN SINGLE LES: CPT

## 2025-01-30 PROCEDURE — OTHER COUNSELING: OTHER

## 2025-01-30 PROCEDURE — OTHER ADDITIONAL NOTES: OTHER

## 2025-01-30 PROCEDURE — OTHER MIPS QUALITY: OTHER

## 2025-01-30 PROCEDURE — OTHER BIOPSY BY SHAVE METHOD: OTHER

## 2025-01-30 PROCEDURE — 99213 OFFICE O/P EST LOW 20 MIN: CPT | Mod: 25

## 2025-01-30 PROCEDURE — 17000 DESTRUCT PREMALG LESION: CPT | Mod: 59

## 2025-01-30 PROCEDURE — 17003 DESTRUCT PREMALG LES 2-14: CPT

## 2025-01-30 PROCEDURE — OTHER LIQUID NITROGEN: OTHER

## 2025-01-30 ASSESSMENT — LOCATION SIMPLE DESCRIPTION DERM
LOCATION SIMPLE: LEFT FOREARM
LOCATION SIMPLE: NECK
LOCATION SIMPLE: RIGHT HAND
LOCATION SIMPLE: LEFT CALF
LOCATION SIMPLE: LEFT UPPER BACK
LOCATION SIMPLE: CHEST
LOCATION SIMPLE: FRONTAL SCALP
LOCATION SIMPLE: LEFT THIGH
LOCATION SIMPLE: LOWER BACK
LOCATION SIMPLE: LEFT EAR
LOCATION SIMPLE: POSTERIOR SCALP
LOCATION SIMPLE: RIGHT FOREARM
LOCATION SIMPLE: LEFT WRIST
LOCATION SIMPLE: LEFT CHEEK

## 2025-01-30 ASSESSMENT — LOCATION ZONE DERM
LOCATION ZONE: LEG
LOCATION ZONE: HAND
LOCATION ZONE: NECK
LOCATION ZONE: TRUNK
LOCATION ZONE: FACE
LOCATION ZONE: ARM
LOCATION ZONE: SCALP
LOCATION ZONE: EAR

## 2025-01-30 ASSESSMENT — LOCATION DETAILED DESCRIPTION DERM
LOCATION DETAILED: LEFT DISTAL DORSAL FOREARM
LOCATION DETAILED: LEFT CENTRAL MALAR CHEEK
LOCATION DETAILED: STERNUM
LOCATION DETAILED: LEFT SUPERIOR CENTRAL MALAR CHEEK
LOCATION DETAILED: RIGHT ULNAR DORSAL HAND
LOCATION DETAILED: RIGHT CENTRAL POSTERIOR NECK
LOCATION DETAILED: LEFT DISTAL CALF
LOCATION DETAILED: LEFT LATERAL DORSAL WRIST
LOCATION DETAILED: POSTERIOR MID-PARIETAL SCALP
LOCATION DETAILED: LEFT ANTERIOR PROXIMAL THIGH
LOCATION DETAILED: LEFT ANTERIOR EARLOBE
LOCATION DETAILED: RIGHT VENTRAL DISTAL FOREARM
LOCATION DETAILED: INFERIOR LUMBAR SPINE
LOCATION DETAILED: LEFT SUPERIOR UPPER BACK
LOCATION DETAILED: RIGHT PROXIMAL RADIAL DORSAL FOREARM
LOCATION DETAILED: MEDIAL FRONTAL SCALP

## 2025-01-30 NOTE — PROCEDURE: BIOPSY BY SHAVE METHOD
Body Location Override (Optional - Billing Will Still Be Based On Selected Body Map Location If Applicable): right frontal scalp
Detail Level: Detailed
Depth Of Biopsy: dermis
Was A Bandage Applied: Yes
Size Of Lesion In Cm: 0
Biopsy Type: H and E
Biopsy Method: Dermablade
Anesthesia Type: 1% lidocaine with epinephrine
Anesthesia Volume In Cc: 0.5
Hemostasis: Drysol
Wound Care: Petrolatum
Dressing: bandage
Destruction After The Procedure: No
Type Of Destruction Used: Curettage
Curettage Text: The wound bed was treated with curettage after the biopsy was performed.
Cryotherapy Text: The wound bed was treated with cryotherapy after the biopsy was performed.
Electrodesiccation Text: The wound bed was treated with electrodesiccation after the biopsy was performed.
Electrodesiccation And Curettage Text: The wound bed was treated with electrodesiccation and curettage after the biopsy was performed.
Silver Nitrate Text: The wound bed was treated with silver nitrate after the biopsy was performed.
Lab: [id number]
Lab Facility: [id number]
Medical Necessity Information: It is in your best interest to select a reason for this procedure from the list below. All of these items fulfill various CMS LCD requirements except the new and changing color options.
Consent: Written consent was obtained and risks were reviewed including but not limited to scarring, infection, bleeding, scabbing, incomplete removal, nerve damage and allergy to anesthesia.
Post-Care Instructions: I reviewed with the patient in detail post-care instructions. Patient is to keep the biopsy site dry overnight, and then apply bacitracin twice daily until healed. Patient may apply hydrogen peroxide soaks to remove any crusting.
Notification Instructions: Patient will be notified of biopsy results. However, patient instructed to call the office if not contacted within 2 weeks.
Billing Type: Third-Party Bill
Information: Selecting Yes will display possible errors in your note based on the variables you have selected. This validation is only offered as a suggestion for you. PLEASE NOTE THAT THE VALIDATION TEXT WILL BE REMOVED WHEN YOU FINALIZE YOUR NOTE. IF YOU WANT TO FAX A PRELIMINARY NOTE YOU WILL NEED TO TOGGLE THIS TO 'NO' IF YOU DO NOT WANT IT IN YOUR FAXED NOTE.

## 2025-05-21 ENCOUNTER — APPOINTMENT (OUTPATIENT)
Dept: URBAN - NONMETROPOLITAN AREA SURGERY 8 | Age: 70
Setting detail: DERMATOLOGY
End: 2025-05-21

## 2025-05-21 DIAGNOSIS — D485 NEOPLASM OF UNCERTAIN BEHAVIOR OF SKIN: ICD-10-CM

## 2025-05-21 DIAGNOSIS — L57.0 ACTINIC KERATOSIS: ICD-10-CM

## 2025-05-21 PROBLEM — D48.5 NEOPLASM OF UNCERTAIN BEHAVIOR OF SKIN: Status: ACTIVE | Noted: 2025-05-21

## 2025-05-21 PROCEDURE — OTHER MIPS QUALITY: OTHER

## 2025-05-21 PROCEDURE — OTHER ADDITIONAL NOTES: OTHER

## 2025-05-21 PROCEDURE — 17000 DESTRUCT PREMALG LESION: CPT | Mod: 59

## 2025-05-21 PROCEDURE — OTHER COUNSELING: OTHER

## 2025-05-21 PROCEDURE — 11102 TANGNTL BX SKIN SINGLE LES: CPT

## 2025-05-21 PROCEDURE — OTHER LIQUID NITROGEN: OTHER

## 2025-05-21 PROCEDURE — OTHER BIOPSY BY SHAVE METHOD: OTHER

## 2025-05-21 ASSESSMENT — LOCATION DETAILED DESCRIPTION DERM
LOCATION DETAILED: LEFT DISTAL DORSAL FOREARM
LOCATION DETAILED: LEFT LATERAL TEMPLE

## 2025-05-21 ASSESSMENT — LOCATION SIMPLE DESCRIPTION DERM
LOCATION SIMPLE: LEFT FOREARM
LOCATION SIMPLE: LEFT TEMPLE

## 2025-05-21 ASSESSMENT — LOCATION ZONE DERM
LOCATION ZONE: ARM
LOCATION ZONE: FACE

## 2025-08-12 ENCOUNTER — APPOINTMENT (OUTPATIENT)
Dept: URBAN - NONMETROPOLITAN AREA SURGERY 8 | Age: 70
Setting detail: DERMATOLOGY
End: 2025-08-13

## 2025-08-12 DIAGNOSIS — Z87.2 PERSONAL HISTORY OF DISEASES OF THE SKIN AND SUBCUTANEOUS TISSUE: ICD-10-CM

## 2025-08-12 DIAGNOSIS — L57.8 OTHER SKIN CHANGES DUE TO CHRONIC EXPOSURE TO NONIONIZING RADIATION: ICD-10-CM

## 2025-08-12 DIAGNOSIS — Z71.89 OTHER SPECIFIED COUNSELING: ICD-10-CM

## 2025-08-12 DIAGNOSIS — D22 MELANOCYTIC NEVI: ICD-10-CM

## 2025-08-12 DIAGNOSIS — D485 NEOPLASM OF UNCERTAIN BEHAVIOR OF SKIN: ICD-10-CM

## 2025-08-12 DIAGNOSIS — L82.1 OTHER SEBORRHEIC KERATOSIS: ICD-10-CM

## 2025-08-12 DIAGNOSIS — D18.0 HEMANGIOMA: ICD-10-CM

## 2025-08-12 DIAGNOSIS — L57.0 ACTINIC KERATOSIS: ICD-10-CM

## 2025-08-12 DIAGNOSIS — Z85.828 PERSONAL HISTORY OF OTHER MALIGNANT NEOPLASM OF SKIN: ICD-10-CM

## 2025-08-12 PROBLEM — D22.9 MELANOCYTIC NEVI, UNSPECIFIED: Status: ACTIVE | Noted: 2025-08-12

## 2025-08-12 PROBLEM — D48.5 NEOPLASM OF UNCERTAIN BEHAVIOR OF SKIN: Status: ACTIVE | Noted: 2025-08-12

## 2025-08-12 PROBLEM — D18.01 HEMANGIOMA OF SKIN AND SUBCUTANEOUS TISSUE: Status: ACTIVE | Noted: 2025-08-12

## 2025-08-12 PROCEDURE — OTHER ADDITIONAL NOTES: OTHER

## 2025-08-12 PROCEDURE — OTHER BIOPSY BY SHAVE METHOD: OTHER

## 2025-08-12 PROCEDURE — 11102 TANGNTL BX SKIN SINGLE LES: CPT

## 2025-08-12 PROCEDURE — 17003 DESTRUCT PREMALG LES 2-14: CPT

## 2025-08-12 PROCEDURE — OTHER MIPS QUALITY: OTHER

## 2025-08-12 PROCEDURE — 99213 OFFICE O/P EST LOW 20 MIN: CPT | Mod: 25

## 2025-08-12 PROCEDURE — OTHER LIQUID NITROGEN: OTHER

## 2025-08-12 PROCEDURE — OTHER COUNSELING: OTHER

## 2025-08-12 PROCEDURE — 17000 DESTRUCT PREMALG LESION: CPT | Mod: 59

## 2025-08-12 ASSESSMENT — LOCATION SIMPLE DESCRIPTION DERM
LOCATION SIMPLE: LEFT UPPER BACK
LOCATION SIMPLE: POSTERIOR SCALP
LOCATION SIMPLE: RIGHT EAR
LOCATION SIMPLE: LEFT CALF
LOCATION SIMPLE: NECK
LOCATION SIMPLE: LEFT THIGH
LOCATION SIMPLE: CHEST
LOCATION SIMPLE: LOWER BACK

## 2025-08-12 ASSESSMENT — LOCATION DETAILED DESCRIPTION DERM
LOCATION DETAILED: STERNUM
LOCATION DETAILED: POSTERIOR MID-PARIETAL SCALP
LOCATION DETAILED: LEFT DISTAL CALF
LOCATION DETAILED: RIGHT TRAGUS
LOCATION DETAILED: RIGHT ANTERIOR EARLOBE
LOCATION DETAILED: LEFT SUPERIOR UPPER BACK
LOCATION DETAILED: LEFT ANTERIOR PROXIMAL THIGH
LOCATION DETAILED: INFERIOR LUMBAR SPINE
LOCATION DETAILED: LEFT PROXIMAL CALF
LOCATION DETAILED: RIGHT CENTRAL POSTERIOR NECK

## 2025-08-12 ASSESSMENT — LOCATION ZONE DERM
LOCATION ZONE: EAR
LOCATION ZONE: SCALP
LOCATION ZONE: LEG
LOCATION ZONE: TRUNK
LOCATION ZONE: NECK

## 2025-08-21 ENCOUNTER — APPOINTMENT (OUTPATIENT)
Dept: URBAN - NONMETROPOLITAN AREA SURGERY 8 | Age: 70
Setting detail: DERMATOLOGY
End: 2025-08-21

## 2025-08-21 PROBLEM — C44.719 BASAL CELL CARCINOMA OF SKIN OF LEFT LOWER LIMB, INCLUDING HIP: Status: ACTIVE | Noted: 2025-08-21

## 2025-08-21 PROCEDURE — 17263 DSTRJ MAL LES T/A/L 2.1-3.0: CPT | Mod: 79

## 2025-08-21 PROCEDURE — OTHER MIPS QUALITY: OTHER

## 2025-08-21 PROCEDURE — OTHER COUNSELING: OTHER

## 2025-08-21 PROCEDURE — OTHER CURETTAGE: OTHER
